# Patient Record
(demographics unavailable — no encounter records)

---

## 2024-11-05 NOTE — ASSESSMENT
[FreeTextEntry1] : Anal fissure -Continue fiber supplementation -MiraLax as needed -Sitz baths as needed -Follow up in 4-6 weeks for reevaluation

## 2024-11-05 NOTE — HISTORY OF PRESENT ILLNESS
[FreeTextEntry1] : Status post fissurectomy, skin tag removal and Botox injection. Patient reports persistent but improving pain spotting of blood